# Patient Record
Sex: FEMALE
[De-identification: names, ages, dates, MRNs, and addresses within clinical notes are randomized per-mention and may not be internally consistent; named-entity substitution may affect disease eponyms.]

---

## 2018-03-25 ENCOUNTER — HOSPITAL ENCOUNTER (EMERGENCY)
Dept: HOSPITAL 31 - C.ER | Age: 28
Discharge: HOME | End: 2018-03-25
Payer: COMMERCIAL

## 2018-03-25 VITALS
DIASTOLIC BLOOD PRESSURE: 58 MMHG | HEART RATE: 63 BPM | SYSTOLIC BLOOD PRESSURE: 107 MMHG | TEMPERATURE: 98.1 F | RESPIRATION RATE: 16 BRPM

## 2018-03-25 VITALS — OXYGEN SATURATION: 100 %

## 2018-03-25 DIAGNOSIS — R56.9: ICD-10-CM

## 2018-03-25 DIAGNOSIS — O21.0: Primary | ICD-10-CM

## 2018-03-25 DIAGNOSIS — Z3A.08: ICD-10-CM

## 2018-03-25 DIAGNOSIS — O26.891: ICD-10-CM

## 2018-03-25 LAB
ALBUMIN SERPL-MCNC: 4.2 G/DL (ref 3.5–5)
ALBUMIN/GLOB SERPL: 1.2 {RATIO} (ref 1–2.1)
ALT SERPL-CCNC: 14 U/L (ref 9–52)
AST SERPL-CCNC: 14 U/L (ref 14–36)
BACTERIA #/AREA URNS HPF: (no result) /[HPF]
BASOPHILS # BLD AUTO: 0 K/UL (ref 0–0.2)
BASOPHILS NFR BLD: 0.4 % (ref 0–2)
BILIRUB UR-MCNC: NEGATIVE MG/DL
BUN SERPL-MCNC: 6 MG/DL (ref 7–17)
CALCIUM SERPL-MCNC: 8.5 MG/DL (ref 8.6–10.4)
COLOR UR: YELLOW
EOSINOPHIL # BLD AUTO: 0 K/UL (ref 0–0.7)
EOSINOPHIL NFR BLD: 0.5 % (ref 0–4)
ERYTHROCYTE [DISTWIDTH] IN BLOOD BY AUTOMATED COUNT: 15.1 % (ref 11.5–14.5)
GFR NON-AFRICAN AMERICAN: > 60
GLUCOSE UR STRIP-MCNC: NORMAL MG/DL
HCG,QUALITATIVE URINE: POSITIVE
HGB BLD-MCNC: 12.9 G/DL (ref 11–16)
LEUKOCYTE ESTERASE UR-ACNC: (no result) LEU/UL
LYMPHOCYTES # BLD AUTO: 1.9 K/UL (ref 1–4.3)
LYMPHOCYTES NFR BLD AUTO: 21.1 % (ref 20–40)
MCH RBC QN AUTO: 27.4 PG (ref 27–31)
MCHC RBC AUTO-ENTMCNC: 33.2 G/DL (ref 33–37)
MCV RBC AUTO: 82.6 FL (ref 81–99)
MONOCYTES # BLD: 0.7 K/UL (ref 0–0.8)
MONOCYTES NFR BLD: 7.7 % (ref 0–10)
NEUTROPHILS # BLD: 6.3 K/UL (ref 1.8–7)
NEUTROPHILS NFR BLD AUTO: 70.3 % (ref 50–75)
NRBC BLD AUTO-RTO: 0 % (ref 0–2)
PH UR STRIP: 5 [PH] (ref 5–8)
PLATELET # BLD: 248 K/UL (ref 130–400)
PMV BLD AUTO: 10 FL (ref 7.2–11.7)
PROT UR STRIP-MCNC: (no result) MG/DL
RBC # BLD AUTO: 4.72 MIL/UL (ref 3.8–5.2)
RBC # UR STRIP: NEGATIVE /UL
SP GR UR STRIP: 1.03 (ref 1–1.03)
SQUAMOUS EPITHIAL: 46 /HPF (ref 0–5)
UROBILINOGEN UR-MCNC: 2 MG/DL (ref 0.2–1)
WBC # BLD AUTO: 8.9 K/UL (ref 4.8–10.8)

## 2018-03-25 PROCEDURE — 96361 HYDRATE IV INFUSION ADD-ON: CPT

## 2018-03-25 PROCEDURE — 81001 URINALYSIS AUTO W/SCOPE: CPT

## 2018-03-25 PROCEDURE — 99285 EMERGENCY DEPT VISIT HI MDM: CPT

## 2018-03-25 PROCEDURE — 96374 THER/PROPH/DIAG INJ IV PUSH: CPT

## 2018-03-25 PROCEDURE — 80053 COMPREHEN METABOLIC PANEL: CPT

## 2018-03-25 PROCEDURE — 84703 CHORIONIC GONADOTROPIN ASSAY: CPT

## 2018-03-25 PROCEDURE — 85025 COMPLETE CBC W/AUTO DIFF WBC: CPT

## 2018-03-25 NOTE — C.PDOC
History Of Present Illness


27 year old female presents to the emergency following a seizure which occurred 

four days ago. Came to the ER today for this seizure due to the fact that she 

could not find a ride and babysitting for her kids. Patient believes that it is 

due to increased stress at her workplace. Reports that she has had two prior 

seizures, one while she was in college and one during an epidural injection she 

received for her second pregnancy. Patient denies being administered seizure 

medicine before, and seeks a prescription. Patient is currently 8 weeks 

pregnant /M1 with a recent normal ultrasound. Patient confirms nausea and 

vomiting prior to her seizure, stating that it is worse in her current 

pregnancy than ever before.  Admits to occasional cannabis use for nausea 

contorl. Patient denies abdominal pain and vaginal discharge. 


Time Seen by Provider: 18 17:32


Chief Complaint (Nursing): Seizure


History Per: Patient


History/Exam Limitations: no limitations


Recent Seizure Activity Began: Days Ago: (4)


Number Of Seizures: One


Precipitating Factor(s): Other (stress due to work or recent pregnancy related 

nausea and vomiting)





Past Medical History


Reviewed: Historical Data, Nursing Documentation, Vital Signs


Vital Signs: 


 Last Vital Signs











Temp  97.0 F L  18 18:20


 


Pulse  62   18 18:20


 


Resp  20   18 18:20


 


BP  111/69   18 18:20


 


Pulse Ox  100   18 19:06














- Medical History


PMH: No Chronic Diseases


Surgical History: Tonsillectomy (8 yrs old)


Family History: States: No Known Family Hx





- Social History


Hx Tobacco Use: No


Hx Alcohol Use: Yes


Hx Substance Use: No





- Immunization History


Hx Influenza Vaccination: No


Hx Pneumococcal Vaccination: No





Review Of Systems


Except As Marked, All Systems Reviewed And Found Negative.


Gastrointestinal: Positive for: Nausea, Vomiting.  Negative for: Abdominal Pain


Genitourinary: Negative for: Vaginal Discharge





Physical Exam





- Physical Exam


Appears: Well, Non-toxic


Head: Atraumatic, Normacephalic


Eye(s): bilateral: Normal Inspection


Ear(s): Bilateral: Normal


Nose: Normal


Oral Mucosa: Moist


Tongue: Normal Appearing


Neck: Normal, Supple


Chest: Symmetrical


Cardiovascular: Rhythm Regular


Respiratory: Normal Breath Sounds


Gastrointestinal/Abdominal: Normal Exam


Neurological/Psych: Oriented x3, Normal Speech, Normal Cognition, Other (awake 

and alert)





ED Course And Treatment





- Laboratory Results


Result Diagrams: 


 18 18:07





 18 18:07


Lab Interpretation: Abnormal (ua wnl, tox + THC, etoh neg)


Urine Pregnancy POC: Positive


O2 Sat by Pulse Oximetry: 100 (RA)


Pulse Ox Interpretation: Normal


Progress Note: zofran, pepcid, IVF, tylenol PO


Reevaluation Time: 18:59


Reassessment Condition: Improved





Medical Decision Making


Medical Decision Making: 


Plan:


* EKG


* Bloodwork


* Urinalysis


* IV Fluids


* Pepcid 20mg IVP


* Tylenol 975mg PO


* Zofran 4mg PO








early pregnancy, hyperemesis gravidarum


cannabis use in pregnancy


underlying seizure disorder, prob provoked by stress @ work


Desires to restart seizure meds during pregnancy and understands this would not 

guarantee seizure-free pregnancy





pregnancy eval NOT repeated as recently assesed as wnl @ 10 wks.





Disposition


Doctor Will See Patient In The: Office


Counseled Patient/Family Regarding: Studies Performed, Diagnosis





- Disposition


Referrals: 


Terry Meraz MD [Staff Provider] - 


Disposition: HOME/ ROUTINE


Disposition Time: 19:01


Condition: GOOD


Additional Instructions: 


avoid stresses which may provoke seizures- as reviewed in your ED visit





Call to follow-up with Dr Meraz- Neurology- she is a seizure expert and will be 

able to best guide you.





Pepcid 20 mg @ night to lower stomach acid (higher during pregnancy and nausea)





Zofran ODT 4 mg once every 6-8 hours as needed for nausea/vomiting





diet changes to help with your chronic nausea.





Return to ED or our [facility] Practice [facility] as needed. 


Prescriptions: 


Ondansetron ODT [Zofran ODT] 4 mg PO Q8H PRN #8 odt


 PRN Reason: nausea vomiting


Instructions:  Hyperemesis Gravidarum, Medications and Pregnancy, Seizures, 

Adult (DC)


Forms:  [facility] (English)





- Clinical Impression


Clinical Impression: 


 Seizure, Pregnancy, Hyperemesis gravidarum








- Scribe Statement


The provider has reviewed the documentation as recorded by the Scribe (Greg Macias)


Provider Attestation: 


All medical record entries made by the Scribe were at my direction and 

personally dictated by me. I have reviewed the chart and agree that the record 

accurately reflects my personal performance of the history, physical exam, 

medical decision making, and the department course for this patient. I have 

also personally directed, reviewed, and agree with the discharge instructions 

and disposition.

## 2018-10-28 ENCOUNTER — HOSPITAL ENCOUNTER (INPATIENT)
Dept: HOSPITAL 31 - C.EROB | Age: 28
LOS: 3 days | Discharge: HOME | DRG: 540 | End: 2018-10-31
Attending: OBSTETRICS & GYNECOLOGY | Admitting: OBSTETRICS & GYNECOLOGY
Payer: MEDICAID

## 2018-10-28 VITALS — BODY MASS INDEX: 27.4 KG/M2

## 2018-10-28 DIAGNOSIS — O34.219: ICD-10-CM

## 2018-10-28 DIAGNOSIS — Z30.2: ICD-10-CM

## 2018-10-28 DIAGNOSIS — N73.6: ICD-10-CM

## 2018-10-28 DIAGNOSIS — Z3A.38: ICD-10-CM

## 2018-10-28 DIAGNOSIS — E55.9: ICD-10-CM

## 2018-10-28 DIAGNOSIS — O99.89: ICD-10-CM

## 2018-10-28 LAB
ALBUMIN SERPL-MCNC: 3.6 [, G/DL] (ref 3.5–5)
ALBUMIN/GLOB SERPL: 1 [,] (ref 1–2.1)
ALT SERPL-CCNC: 10 [, U/L] (ref 9–52)
AST SERPL-CCNC: 18 [, U/L] (ref 14–36)
BASOPHILS # BLD AUTO: 0 [, K/UL] (ref 0–0.2)
BASOPHILS NFR BLD: 0.4 [, %] (ref 0–2)
BILIRUB UR-MCNC: NEGATIVE [,]
BUN SERPL-MCNC: 7 [, MG/DL] (ref 7–17)
CALCIUM SERPL-MCNC: 8.9 [, MG/DL] (ref 8.6–10.4)
EOSINOPHIL # BLD AUTO: 0.1 [, K/UL] (ref 0–0.7)
EOSINOPHIL NFR BLD: 1 [, %] (ref 0–4)
ERYTHROCYTE [DISTWIDTH] IN BLOOD BY AUTOMATED COUNT: 19.4 [, %] (ref 11.5–14.5)
GFR NON-AFRICAN AMERICAN: > 60 [,]
GLUCOSE UR STRIP-MCNC: NORMAL [, MG/DL]
HGB BLD-MCNC: 10.9 [, G/DL] (ref 11–16)
LEUKOCYTE ESTERASE UR-ACNC: (no result) [, LEU/UL]
LYMPHOCYTES # BLD AUTO: 2.5 [, K/UL] (ref 1–4.3)
LYMPHOCYTES NFR BLD AUTO: 28 [, %] (ref 20–40)
MCH RBC QN AUTO: 25 [, PG] (ref 27–31)
MCHC RBC AUTO-ENTMCNC: 32.3 [, G/DL] (ref 33–37)
MCV RBC AUTO: 77.4 [, FL] (ref 81–99)
MONOCYTES # BLD: 0.7 [, K/UL] (ref 0–0.8)
MONOCYTES NFR BLD: 8.5 [, %] (ref 0–10)
NEUTROPHILS # BLD: 5.4 [, K/UL] (ref 1.8–7)
NEUTROPHILS NFR BLD AUTO: 62.1 [, %] (ref 50–75)
NRBC BLD AUTO-RTO: 0.1 [, %] (ref 0–2)
PH UR STRIP: 6 [,] (ref 5–8)
PLATELET # BLD: 197 [, K/UL] (ref 130–400)
PMV BLD AUTO: 9.4 [, FL] (ref 7.2–11.7)
PROT UR STRIP-MCNC: NEGATIVE [, MG/DL]
RBC # BLD AUTO: 4.35 [, MIL/UL] (ref 3.8–5.2)
RBC # UR STRIP: NEGATIVE [,]
SP GR UR STRIP: 1.01 [,] (ref 1–1.03)
SQUAMOUS EPITHIAL: < 1 [, /HPF] (ref 0–5)
UROBILINOGEN UR-MCNC: 2 [, MG/DL] (ref 0.2–1)
WBC # BLD AUTO: 8.8 [, K/UL] (ref 4.8–10.8)

## 2018-10-28 PROCEDURE — 0UL70ZZ OCCLUSION OF BILATERAL FALLOPIAN TUBES, OPEN APPROACH: ICD-10-PCS | Performed by: OBSTETRICS & GYNECOLOGY

## 2018-10-28 PROCEDURE — 0DNW0ZZ RELEASE PERITONEUM, OPEN APPROACH: ICD-10-PCS | Performed by: OBSTETRICS & GYNECOLOGY

## 2018-10-28 RX ADMIN — CEFOXITIN SODIUM SCH MLS/HR: 2 INJECTION, SOLUTION INTRAVENOUS at 16:47

## 2018-10-28 RX ADMIN — CEFOXITIN SODIUM SCH MLS/HR: 2 INJECTION, SOLUTION INTRAVENOUS at 17:31

## 2018-10-28 RX ADMIN — SIMETHICONE CHEW TAB 80 MG SCH: 80 TABLET ORAL at 15:22

## 2018-10-28 RX ADMIN — SIMETHICONE CHEW TAB 80 MG SCH MG: 80 TABLET ORAL at 22:16

## 2018-10-28 NOTE — OBDS
===================================

DELIVERY PERSONNEL

===================================

   

Delivery Doctor:  ARANZA Wasserman MD

Scrub Nurse:  Sue Escobar

Circulator:  Magdalena Egan RN

Anesthesiologist:  escobar

Resident:  ANDREW Corado

   

===================================

MATERNAL INFORMATION

===================================

   

Delivery Anesthesia:  Spinal

Medications in Delivery:  PITOCIN, SURGICEL

Estimated  Blood Loss (ml):  600

Placenta Cultured:  No

Maternal Complications:  None; Other

Other Maternal Complications:  H/O anemia and low Vitamin D

RN Comments:  RC/SECTION to a live baby girl, attended to by dr. Lebron and Terry Rn. Apgar 9:9
. stable, skin to skin initiated.

Provider Comments:  Uncomplicated repeat LTCS, lysis of adhesions, with atraumatic delivery of live f
emale infant. LOT, weight 7lb 2oz, Apgar's 9/9; cord pH 7.34. Uncomplicated modified [place] procedur
e performed for permanent sterilization.

   Hemostasis assured throughout the procedure.

   Patient tolerated procedure well. She and infant in stable condition.

   

===================================

LABOR SUMMARY

===================================

   

EDC:  2018 00:00

No. Babies in Womb:  1

 Attempted:  No

Labor Anesthesia:  None

   

===================================

LABOR INFORMATION

===================================

   

Reason for Induction:  Not Applicable

Oxytocin:  N/A

Group B Beta Strep:  Not Done

Antibiotics # of Doses:  1

Antibiotics Time of Last Dose:  10/28/18 0930

Steroids Given:  None

Reason Steroids Not Administered:  Not Applicable

   

===================================

MEMBRANES

===================================

   

Membranes Rupture Method:  Spontaneous

Rupture of Membranes:  10/28/2018 07:15

Length of Rupture (hrs):  3.73

Amniotic Fluid Color:  Clear

Amniotic Fluid Amount:  Moderate

Amniotic Fluid Odor:  Normal

   

===================================

STAGES OF LABOR

===================================

   

Stage 3 hrs:  0

Stage 3 min:  1

   

===================================

CSECTION DELIVERY

===================================

   

Primary Indication:  Repeat Elective

Other Primary Indication:  SROM

CSection Urgency:  Non Elective

CSection Incidence:  Repeat

Labor:  Labor

Elective:  Nonelective

CSection Incision:  Lower Uterine Transverse

Sterilization Procedure:  Kalpesh

Other Sterilization Procedure:  Modified Kalpesh

Uterine Closure:  Single-layer closure

   

===================================

BABY A INFORMATION

===================================

   

Infant Delivery Date/Time:  10/28/2018 10:59

Method of Delivery:  

Born in Route :  No

:  N/A

Forceps:  N/A

Vacuum Extraction:  N/A

Shoulder Dystocia :  No

   

===================================

SHOULDER DYSTOCIA BABY A

===================================

   

Infant Delivery Date/Time:  10/28/2018 10:59

   

===================================

PRESENTATION/POSITION BABY A

===================================

   

Presentation:  Cephalic

Cephalic Presentation:  Vertex

Vertex Position:  Left Occipital Transverse

Breech Presentation:  N/A

   

===================================

PLACENTA INFORMATION BABY A

===================================

   

Placenta Delivery Time :  10/28/2018 11:00

Placenta Method of Delivery:  Manual Removal

Placenta Status:  Delivered

   

===================================

APGAR SCORES BABY A

===================================

   

Heart Rate 1 min:  >100 bpm

Resp Effort 1 min:  Good Cry

Reflex Irritability 1 min:  Cough or Sneeze or Pulls Away

Muscle Tone 1 min:  Active Motion

Color 1 min:  Body Pink, Extremities Blue

APGAR SCORE 1 MIN:  9

Heart Rate 5 min:  >100 bpm

Resp Effort 5 min:  Good Cry

Reflex Irritability 5 min:  Cough or Sneeze or Pulls Away

Muscle Tone 5 min:  Active Motion

Color 5 min:  Body Pink, Extremities Blue

APGAR SCORE 5 MIN:  9

   

===================================

INFANT INFORMATION BABY A

===================================

   

Gestational Age at Delivery:  38.4

Gestational Status:  Term

Infant Outcome :  Liveborn

Infant Condition :  Stable

Infant Sex:  Female

   

===================================

IDENTIFICATION/MEDS BABY A

===================================

   

ID Band Number:  [id number]

Sensor Number:  [id number]

   

===================================

WEIGHT/LENGTH BABY A

===================================

   

Infant Birthweight (gms):  3220

Infant Weight (lb):  7

Infant Weight (oz):  2

Infant Length Inches:  20.00

Infant Length cms:  50.8

   

===================================

CORD INFORMATION BABY A

===================================

   

No. Cord Vessels:  3

Nuchal Cord :  N/A

True Knot:  0

Cord Blood Taken:  Yes

Infant Suction:  Mouth; Nose

   

===================================

ASSESSMENT BABY A

===================================

   

Infant Complications:  None

Physical Findings at Delivery:  Within Normal Limits

Infant Respirations:  Appears Normal

Neonatologist/ALS Called :  No

Infant Care By:  / KAMALJIT Valencia Rn

Transferred To:  Remains with Mother

## 2018-10-28 NOTE — OBHP
===========================

Datetime: 10/28/2018 08:53

===========================

   

IP Adm Impression:  Term, intrauterine pregnancy; No Active Labor; Ruptured Membranes

IP Adm Impression Other:  Prev C/S; multiparity desire permanent sterilization

IP Admit Plan:  Admit to unit; Initiate  Section protocol

Admit Comment, IP Provider:  Patient seen and evaluated at 0818 hours.

      

   28 y.o. , LMP 1/10/18, KADY 18, EGA 38w 4d prev C/S x 2 c/o SROM  approximately 0715 h
ours; clear. No VB. (+) mild Ctx, pain scale 5/10 has had approx 3 since ROM. (+) AFM.  Prenatal: car
dianne initiated with PMD, Dr. Shay June (>7); due to insurance issues, switched to clinic care at [facility], [place] (1 visit); was scheduled for 10/29/18. Anemia, Vit D insufficiency. Treated for UTI x 1, early 
in pregnancy

      

   P Ob:  C/S x 2, both males, both at [facility]. 2016, 7lb 11oz, precipited by seizure activity S
/P epidural.  2017, 9lb 2oz, elective repeat, no GDM. No other complications.  , VTOP, 7 weeks, w
ith D/_; no complications

   P GYN: 12 x monthly x 7. Denies h/o abnormal Pap, STIs, leiomyoma or ovarian cysts

   PMH: Vit D insufficiency, anemia

   PSH: Age 6 - tonsillectomy, C/S x 2; D_C x 1

   NKDA

   No food allergies

   Meds: PNV, iron and Vit D 1,000IU - each once a day

   Soc Hx: denies tobacco, illicit drug, or EtOH use.  x 3 years; together 7 years. Works as a
n eye techinician

   Fam Hx: Mother  age 24 - pneumonia.  Father approx 40+ y.o. - Pt has no contact.  no known
 fam h/o cancer

      

      

   P.E.: as above. WD in NAD. Awake, alert, oriented to time, person and place. Pleasant and cooperat
brody

      

      

   Assessment: 28 y.o. , prev C/S x 2, SROM - confirmed. Desires permanent sterilization - conse
nt signed 18; reaffirmed.  Reviewed R/B/C of repeat C/S and permanent sterilization; no question
s offered. Consents signed, dated, witnessed and placed in chart. Category 1 tracing. Patient last at
e at 0200 hours; will observe 8 hours NPO. Patient is clinically stable.

      

   Plan:

   1) Admit

   2) NPO

   3)Admission labs, incl T_C

   4) Continous EFM

   5) IVFs

   6) Brown

   7) Abdominal prep and shave

   8) Notify anesthesia

   9) Notify peds

   10) Mefoxin, on call to O.R.

   11) Patient on call to O.R.

Pelvic Type - PN:  Adequate

Extremities - PN:  Normal

Abdomen - PN:  Normal

Back - PN:  Normal

Breast - PN:  Not Done

Lungs - PN:  Normal

Heart - PN:  Normal

Thyroid - PN:  Normal

Neurologic - PN:  Normal

HEENT - PN:  Normal

General - PN:  Normal

Fetal Presentation-Admit:  Vertex

FHR - Baseline A Provider:  125

Contraction Comments Provider:  irregular

Comments, ACOG Physical Exam:  Abdomen: Gravid. Soft. Non tender. Healed Pfannenstiel incision

   Perneum : wet, clear fluid seen running over perineum. nitrazine (+)

      

      

   All other systems reviewed and are negaitve

Gestation - Est Wks by US:  38w 4d

IP Prenatal Hx Assessment:  The Prenatal History has been Reviewed and is Current

EGA AdmitDate IP:  38.4

Vital Signs Provider:  Reviewed

IP Chief Complaint:  Suspected ruptured membranes

NICHD Variability Prov Fetus A:  Moderate 6-25bpm

NICHD Accel Fetus A IP Provider:  [id number]

FHR Category Provider Fetus A:  Category I

NICHD Decel Fetus A IP Provider:  None

Dilatation, Provider:  2

Effacement, Provider:  40

Station, Provider:  0

Genitourinary Exam:  Normal

DTRs - PN:  Not Done

## 2018-10-28 NOTE — PCM.SURG1
Surgeon's Initial Post Op Note





- Surgeon's Notes


Surgeon: Tami Wasserman MD


Assistant: Francie Corado DO, PGY-2


Type of Anesthesia: Spinal


Anesthesia Administered By: Fercho Reis MD


Pre-Operative Diagnosis: 38 weeks gestation, previous C/S x 2, Spontaneous ROM; 

multiparity, desiring permanent sterilization


Operative Findings: Live female infant, LOT, weight 7lb 2oz, Apgar's 9/9; cord 

pH 7.34.  Pelvic adhesions - lower uterine segmental to anterior abdominal wall.

Normal, uterus; normal fallopian tubes and ovaries, bilaterally


Post-Operative Diagnosis: Same; pelvic adhesions


Operation Performed: Repeat LTCS; modified [place] procedure; lysis of adhesions


Specimen/Specimens Removed: Segment of fallopian tubes, bilaterally


Estimated Blood Loss: EBL {In ML}: 600 (800 ml clear urine; 1600 mL LR)


Blood Products Given: N/A


Drains Used: No Drains


Post-Op Condition: Good


Date of Surgery/Procedure: 10/28/18


Time of Surgery/Procedure: 12:30

## 2018-10-29 LAB
BASOPHILS # BLD AUTO: 0 [, K/UL] (ref 0–0.2)
BASOPHILS NFR BLD: 0.3 [, %] (ref 0–2)
EOSINOPHIL # BLD AUTO: 0.1 [, K/UL] (ref 0–0.7)
EOSINOPHIL NFR BLD: 0.5 [, %] (ref 0–4)
ERYTHROCYTE [DISTWIDTH] IN BLOOD BY AUTOMATED COUNT: 18.7 [, %] (ref 11.5–14.5)
HGB BLD-MCNC: 10.4 [, G/DL] (ref 11–16)
LYMPHOCYTES # BLD AUTO: 1.6 [, K/UL] (ref 1–4.3)
LYMPHOCYTES NFR BLD AUTO: 15 [, %] (ref 20–40)
MCH RBC QN AUTO: 25.2 [, PG] (ref 27–31)
MCHC RBC AUTO-ENTMCNC: 32.6 [, G/DL] (ref 33–37)
MCV RBC AUTO: 77.3 [, FL] (ref 81–99)
MONOCYTES # BLD: 0.5 [, K/UL] (ref 0–0.8)
MONOCYTES NFR BLD: 4.6 [, %] (ref 0–10)
NEUTROPHILS # BLD: 8.3 [, K/UL] (ref 1.8–7)
NEUTROPHILS NFR BLD AUTO: 79.6 [, %] (ref 50–75)
NRBC BLD AUTO-RTO: 0.1 [, %] (ref 0–2)
PLATELET # BLD: 207 [, K/UL] (ref 130–400)
PMV BLD AUTO: 9.7 [, FL] (ref 7.2–11.7)
RBC # BLD AUTO: 4.13 [, MIL/UL] (ref 3.8–5.2)
WBC # BLD AUTO: 10.4 [, K/UL] (ref 4.8–10.8)

## 2018-10-29 RX ADMIN — PRENATAL VIT W/ FE FUMARATE-FA TAB 27-0.8 MG SCH TAB: 27-0.8 TAB at 09:15

## 2018-10-29 RX ADMIN — OXYCODONE HYDROCHLORIDE AND ACETAMINOPHEN PRN TAB: 5; 325 TABLET ORAL at 22:24

## 2018-10-29 RX ADMIN — OXYCODONE HYDROCHLORIDE AND ACETAMINOPHEN PRN TAB: 5; 325 TABLET ORAL at 05:36

## 2018-10-29 RX ADMIN — OXYCODONE HYDROCHLORIDE AND ACETAMINOPHEN PRN TAB: 5; 325 TABLET ORAL at 17:39

## 2018-10-29 RX ADMIN — SIMETHICONE CHEW TAB 80 MG SCH MG: 80 TABLET ORAL at 13:50

## 2018-10-29 RX ADMIN — SIMETHICONE CHEW TAB 80 MG SCH MG: 80 TABLET ORAL at 17:36

## 2018-10-29 RX ADMIN — CEFOXITIN SODIUM SCH MLS/HR: 2 INJECTION, SOLUTION INTRAVENOUS at 00:59

## 2018-10-29 RX ADMIN — CEFOXITIN SODIUM SCH MLS/HR: 2 INJECTION, SOLUTION INTRAVENOUS at 09:14

## 2018-10-29 RX ADMIN — SIMETHICONE CHEW TAB 80 MG SCH MG: 80 TABLET ORAL at 09:15

## 2018-10-29 RX ADMIN — SIMETHICONE CHEW TAB 80 MG SCH MG: 80 TABLET ORAL at 22:23

## 2018-10-29 NOTE — OP
PROCEDURE DATE:  10/28/2018



SURGEON: Tami Wasserman MD



FIRST ASSISTANT:  Francie Corado DO, PGY-2



TYPE OF ANESTHESIA:  Spinal.



ANESTHESIOLOGIST:  Fercho Reis MD



PREOPERATIVE DIAGNOSIS:  38 weeks' gestation, previous Caesarean section x2

with spontaneous rupture of membranes; multiparity, desiring permanent

sterilization.



POSTOPERATIVE DIAGNOSIS:  38 weeks' gestation, previous Caesarean section x2

with spontaneous rupture of membranes; multiparity, desiring permanent

sterilization; pelvic adhesions.



OPERATIVE FINDINGS:  Live female infant from the left occipital transverse

position, weight 7 pounds 2 ounces and Apgars of 9 and 9 at 1 and 5 minutes,

respectively.  Cord pH of 7.34.  Pelvic adhesions noted from the

lower uterine segment to the anterior abdominal wall.  Normal uterus and

normal fallopian tubes and ovaries, bilaterally.



OPERATION(S) PERFORMED:  Repeat transverse lower uterine segment Caesarean;

modified Kalpesh procedure; and lysis of adhesions.



SPECIMENS:  Portions of right and left fallopian tubes.



ESTIMATED BLOOD LOSS:  600 mL



URINE OUTPUT:  800 mL of clear urine.



INTRAVENOUS FLUIDS:  1600 mL of Lactated Ringer's, 20 units of Pitocin were

added per 1000 mL bag.



BLOOD PRODUCTS GIVEN:  None.



COMPLICATIONS:  None.



PROCEDURE:  The patient was taken to the operating room after having

obtained informed consent for the anticipated procedure.  This included a

discussion of possible risks and complications including but not limited to

infection requiring continued antibiotics, hemorrhage requiring blood

transfusion, repair of any damage to internal organs, Caesarean

hysterectomy.  There was also a discussion of possible risk of failure of

permanent sterilization.  The patient expressed an understanding.  No

questions were offered.  Consent forms were signed, dated, witnessed and

placed in the chart.  The patient had a Brown catheter inserted under

sterile conditions, and prior to being escorted to the operating room,

Mefoxin 2 grams were administered intravenously.  In the operating room, 

she was placed on the operating table in a sitting position and spinal

anesthesia was administered without incident.  The patient was immediately

repositioned to a supine position.  Fetal heart rate was auscultated to

145 beats per minute.  The abdomen was prepped and she was subsequently

draped in the usual sterile fashion.  After assuring an adequate level of

anesthesia, using a scalpel, a Pfannenstiel incision was made through the

previous scar.  The incision was carried down through the subcutaneous

tissue to the level of the fascia, which was identified and nicked in the

midline.  The fascial incision was then extended bilaterally also using the

Bovie electrocautery.  The rectus muscles were dissected off the overlying

fascia.  Using a scalpel, the rectus muscle was  in the midline

and the parietal peritoneum was entered via sharp dissection.  Upon

entering the abdominal cavity, surveillance of the contents revealed the

findings of pelvic adhesions as described above.  Using alternating

cutting and clamping and securing ligated ends with chromic plain tie,

lysis of adhesions was performed.  The decision was made to not create the

bladder flap.  A transverse incision was then made on the lower uterine

segment.  Upon entering the uterine cavity, a small amount of clear

amniotic fluid was still noted.  Atraumatic delivery of the infant with

the findings of above then ensued.  Once on the operative field, the

infant's mouth and nose were bulb suctioned as the umbilical cord was

doubly clamped and cut.  The infant was handed off the operative field to

the pediatrician in attendance.  A segment of the umbilical cord was

sequestered for cord pH analysis, results as above.  The placenta was then

delivered by manual extraction.  It was grossly normal and three vessels

were present in the cord.  The uterus was then exteriorized for closure. 

This was done in one layer using 0 Vicryl in a running interlocking

fashion.  There had been an extension to the lower uterine segment inferiorly 
and

on the left of approximately 4 cm.  This was also reapproximated using 0 Vicryl 
in a

running interlocking fashion.  Additional sutures using 2-0 Monocryl were

placed in figure-of-eight to assure adequate hemostasis.  Attention was

then directed to the posterior aspect of the uterus with normal adnexal

findings as described above.  Starting with the right fallopian tube in the

isthmic portion, the mesosalpinx was grasped with a Oakes

clamp.  Free ties x2 were placed using 0 plain catgut.  The intervening

mesosalpinx was pierced with a Metzenbaum scissors; that segment was

resected and submitted to pathology for further confirmation.  Adequate

hemostasis was assured.  A similar procedure was performed on the left

fallopian tube.  After assuring adequate hemostasis, copious irrigation was

performed.  The uterus was then returned to the abdominal cavity.  The

paracolic gutters were cleared of all debris.  Again, hemostasis was

assured on the uterine incision and Surgicel was placed along the uterine

incision.  The parietal peritoneum was then closed using 2-0 chromic in a

running fashion and the fascia was reapproximated using 1-0 Vicryl in a 

running fashion using two halves.  The skin was reapproximated

using 3-0 Monocryl on a Maciej needle.  Steri-Strips were applied and a

pressure dressing was applied.  The patient was repositioned in a frog-leg

manner.  Bimanual exploration was performed.  There were no significant

clots retrieved.  The uterus was contracted and firm, approximately one

fingerbreadth below the umbilicus.  The patient was then transferred back

to [facility] for recovery.  The infant had been transferred to the well

baby nursery.  Both mother and infant were in stable condition.





__________________________________________

Tami Wasserman MD





DD:  10/28/2018 13:01:39

DT:  10/28/2018 15:06:37

Job # [id number]



[id number]

## 2018-10-30 VITALS — OXYGEN SATURATION: 97 %

## 2018-10-30 VITALS — RESPIRATION RATE: 18 BRPM

## 2018-10-30 LAB
BASOPHILS # BLD AUTO: 0 [, K/UL] (ref 0–0.2)
BASOPHILS NFR BLD: 0.3 [, %] (ref 0–2)
EOSINOPHIL # BLD AUTO: 0.2 [, K/UL] (ref 0–0.7)
EOSINOPHIL NFR BLD: 1.5 [, %] (ref 0–4)
ERYTHROCYTE [DISTWIDTH] IN BLOOD BY AUTOMATED COUNT: 19.5 [, %] (ref 11.5–14.5)
HGB BLD-MCNC: 9.8 [, G/DL] (ref 11–16)
LYMPHOCYTES # BLD AUTO: 1.9 [, K/UL] (ref 1–4.3)
LYMPHOCYTES NFR BLD AUTO: 18.3 [, %] (ref 20–40)
MCH RBC QN AUTO: 25.3 [, PG] (ref 27–31)
MCHC RBC AUTO-ENTMCNC: 32.7 [, G/DL] (ref 33–37)
MCV RBC AUTO: 77.3 [, FL] (ref 81–99)
MONOCYTES # BLD: 0.5 [, K/UL] (ref 0–0.8)
MONOCYTES NFR BLD: 4.9 [, %] (ref 0–10)
NEUTROPHILS # BLD: 7.9 [, K/UL] (ref 1.8–7)
NEUTROPHILS NFR BLD AUTO: 75 [, %] (ref 50–75)
NRBC BLD AUTO-RTO: 0 [, %] (ref 0–2)
PLATELET # BLD: 207 [, K/UL] (ref 130–400)
PMV BLD AUTO: 9.7 [, FL] (ref 7.2–11.7)
RBC # BLD AUTO: 3.85 [, MIL/UL] (ref 3.8–5.2)
WBC # BLD AUTO: 10.5 [, K/UL] (ref 4.8–10.8)

## 2018-10-30 RX ADMIN — SIMETHICONE CHEW TAB 80 MG SCH MG: 80 TABLET ORAL at 21:56

## 2018-10-30 RX ADMIN — SIMETHICONE CHEW TAB 80 MG SCH MG: 80 TABLET ORAL at 13:07

## 2018-10-30 RX ADMIN — SIMETHICONE CHEW TAB 80 MG SCH MG: 80 TABLET ORAL at 09:19

## 2018-10-30 RX ADMIN — SIMETHICONE CHEW TAB 80 MG SCH MG: 80 TABLET ORAL at 17:38

## 2018-10-30 RX ADMIN — PRENATAL VIT W/ FE FUMARATE-FA TAB 27-0.8 MG SCH TAB: 27-0.8 TAB at 09:19

## 2018-10-30 RX ADMIN — OXYCODONE HYDROCHLORIDE AND ACETAMINOPHEN PRN TAB: 5; 325 TABLET ORAL at 16:42

## 2018-10-30 RX ADMIN — OXYCODONE HYDROCHLORIDE AND ACETAMINOPHEN PRN TAB: 5; 325 TABLET ORAL at 13:10

## 2018-10-30 RX ADMIN — OXYCODONE HYDROCHLORIDE AND ACETAMINOPHEN PRN TAB: 5; 325 TABLET ORAL at 09:18

## 2018-10-30 RX ADMIN — OXYCODONE HYDROCHLORIDE AND ACETAMINOPHEN PRN TAB: 5; 325 TABLET ORAL at 21:53

## 2018-10-30 NOTE — OBPPN
===========================

Datetime: 10/30/2018 07:51

===========================

   

PP Nausea Prov:  Denies

PP Flatus Prov:  Yes

PP BM Prov:  No

PP Heart Prov:  Normal

PP Lungs Prov:  Normal

PP Abdomen/Uterus Prov:  Normal

PP Lochia Prov:  Normal (Annotations: Data stored by [facility] on behalf of user)

PP Extremities Prov:  Normal

PP C/S Incision Prov:  Normal

PP Comments Phys Exam Prov:  Abodmen: Soft, bowel souunds presents, appropriately tender s/p C-sectio
n, incision is clean, dry and intact with steri-strips in place, uterus is firm and below the umbilic
us 

PP Impression Prov:  Normal postpartum progression; Pregnancy Induced Hypertension

PP Plan Prov:  Continue present management

PP Progress Note Prov:  Patient was seen and examined at bedside. Patient states that she is doing we
ll and has no acute issues or complaints. Patient admits passing flatus, urinating without difficulty
, tolerating diet and ambulating without difficulties. Patient denies nausea, vomiting, fever, chills
, bowel movement, dizziness and calf tenderness.

      

   VS: See above, WNL 

      

   PE: See above, WNL 

      

   Labs:

   8.8>10.9/33.7<197, 10.4>10.4/31.9<207

   Rubella Immune and A+

      

   A/P: Patient is a 28 year old [id number] at 38.4 weeks, who is now  via repeat LTCS repeat x2,
 POD #2

      

   1. Stable, Afebrile 

   2. Pain is well-controlled

   3. H/H stable 

   4. Encourage ambulation and hydration 

   5. Encourage breast feeding 

   6. Monitor for bowel function 

   7. Plans for D/C tomorrow

      

   All plans and management discussed with Dr. Gómez  (Annotations: Data stored by [facility] on behalf of
 user)

## 2018-10-31 VITALS — DIASTOLIC BLOOD PRESSURE: 72 MMHG | HEART RATE: 69 BPM | SYSTOLIC BLOOD PRESSURE: 110 MMHG

## 2018-10-31 VITALS — TEMPERATURE: 97 F

## 2018-10-31 RX ADMIN — SIMETHICONE CHEW TAB 80 MG SCH MG: 80 TABLET ORAL at 09:44

## 2018-10-31 RX ADMIN — SIMETHICONE CHEW TAB 80 MG SCH MG: 80 TABLET ORAL at 14:11

## 2018-10-31 RX ADMIN — OXYCODONE HYDROCHLORIDE AND ACETAMINOPHEN PRN TAB: 5; 325 TABLET ORAL at 02:32

## 2018-10-31 RX ADMIN — OXYCODONE HYDROCHLORIDE AND ACETAMINOPHEN PRN TAB: 5; 325 TABLET ORAL at 06:27

## 2018-10-31 RX ADMIN — PRENATAL VIT W/ FE FUMARATE-FA TAB 27-0.8 MG SCH TAB: 27-0.8 TAB at 09:44

## 2018-10-31 NOTE — OBDCSUM
===========================

Datetime: 10/31/2018 08:28

===========================

   

Discharged to, Provider:  Home

Follow up at, Provider:  Dr. Hook

Disch Instr Activity:  Normal activity; May be up to bathroom; May be up for meals; May Shower

Disch Instr Diet:  Regular

Discharge Instructions, Provider:  Routine instructions given

Discharge Diagnosis, Provider:  Term Pregnancy Delivered

Discharge Time:  10/31/2018 09:34

Follow up in weeks, Provider:  Monday, 11/5/18

Disch Referrals:  None

Contraception discussed, Prov:  No

Disch Activity Restrictions:  No exercising; No lifting; No sexual activity; Nothing in vagina - Inte
rcourse, tampons, douche

Discharge Comment, Provider:  Follow up with Dr. Hook 11/5/18 for incision check and postpartum

   Nothing per vagina or sexual intercourse in 6-8 weeks

   Continue to ambulate and hydrate, continue with breast feeding.

   Motrin 600mg PO Q4H prn and Percocet 1 tab PO Q6H prn for pain control

   continue with iron supplement and prenatal vitamins. 

   No heavy lifting for 6-8 weeks

   We encourage intake of prune juice at home for induction of bowel movement 

   Please arrange pediatrician appointment as discussed

   Please take care 

      

   Arlen Cohen DO, PGY-2

      

      

   Attending Note: patient seen and evaluated by me with the Resident. I agree with the above as docu
mented.

      

Discharge Diagnosis Prov Other:  38.4 active labor 

   Previous C/S x 2

   Premature rupture of membranes

   delivered by RLTCS x2 with tubal ligation 

   Lysis of adhesions

   Anemia

## 2018-11-07 NOTE — OBADHP
===========================

Datetime: 10/28/2018 08:53

===========================

   

IP Adm Impression Other:  Prev C/S; multiparity desire permanent sterilization

Admit Comment, IP Provider:  Patient seen and evaluated at 0818 hours.

      

   28 y.o. , LMP 1/10/18, KADY 18, EGA 38w 4d prev C/S x 2 c/o SROM  approximately 0715 h
ours; clear. No VB. (+) mild Ctx, pain scale 5/10 has had approx 3 since ROM. (+) AFM.  Prenatal: car
dianne initiated with PMD, Dr. Shay June (>7); due to insurance issues, switched to clinic care at [facility] (1 visit); was scheduled for 10/29/18. Anemia, Vit D insufficiency. Treated for UTI x 1, early 
in pregnancy

      

   P Ob:  C/S x 2, both males, both at [facility]. 2016, 7lb 11oz, precipited by seizure activity S
/P epidural.  2017, 9lb 2oz, elective repeat, no GDM. No other complications.  , VTOP, 7 weeks, w
ith D/_; no complications

   P GYN: 12 x monthly x 7. Denies h/o abnormal Pap, STIs, leiomyoma or ovarian cysts

   PMH: Vit D insufficiency, anemia

   PSH: Age 6 - tonsillectomy, C/S x 2; D_C x 1

   NKDA

   No food allergies

   Meds: PNV, iron and Vit D 1,000IU - each once a day

   Soc Hx: denies tobacco, illicit drug, or EtOH use.  x 3 years; together 7 years. Works as a
[employer]

   Fam Hx: Mother  age 24 - pneumonia.  Father approx 40+ y.o. - Pt has no contact.  no known
 fam h/o cancer

      

      

   P.E.: as above. WD in NAD. Awake, alert, oriented to time, person and place. Pleasant and cooperat
brody

      

      

   Assessment: 28 y.o. , prev C/S x 2, SROM - confirmed. Desires permanent sterilization - conse
nt signed 9/17/18; reaffirmed.  Reviewed R/B/C of repeat C/S and permanent sterilization; no question
s offered. Consents signed, dated, witnessed and placed in chart. Category 1 tracing. Patient last at
e at 0200 hours; will observe 8 hours NPO. Patient is clinically stable.

      

   Plan:

   1) Admit

   2) NPO

   3)Admission labs, incl T_C

   4) Continous EFM

   5) IVFs

   6) Brown

   7) Abdominal prep and shave

   8) Notify anesthesia

   9) Notify peds

   10) Mefoxin, on call to O.R.

   11) Patient on call to O.R.

Pelvic Type - PN:  Adequate

Extremities - PN:  Normal

Abdomen - PN:  Normal

Back - PN:  Normal

Breast - PN:  Not Done

Lungs - PN:  Normal

Heart - PN:  Normal

Thyroid - PN:  Normal

Neurologic - PN:  Normal

HEENT - PN:  Normal

General - PN:  Normal

Fetal Presentation-Admit:  Vertex

FHR - Baseline A Provider:  125

Contraction Comments Provider:  irregular

Comments, ACOG Physical Exam:  Abdomen: Gravid. Soft. Non tender. Healed Pfannenstiel incision

   Perneum : wet, clear fluid seen running over perineum. nitrazine (+)

      

      

   All other systems reviewed and are negaitve

Gestation - Est Wks by US:  38w 4d

IP Prenatal Hx Assessment:  The Prenatal History has been Reviewed and is Current

Vital Signs Provider:  Reviewed

IP Chief Complaint:  Suspected ruptured membranes

NICHD Variability Prov Fetus A:  Moderate 6-25bpm

NICHD Accel Fetus A IP Provider:  [id number]

FHR Category Provider Fetus A:  Category I

NICHD Decel Fetus A IP Provider:  None

Dilatation, Provider:  2

Effacement, Provider:  40

Station, Provider:  0

Genitourinary Exam:  Normal

DTRs - PN:  Not Done

EGA AdmitDate IP:  38.4

IP Adm Impression:  Term, intrauterine pregnancy; No Active Labor; Ruptured Membranes

IP Admit Plan:  Admit to unit; Initiate  Section protocol